# Patient Record
Sex: FEMALE | Race: BLACK OR AFRICAN AMERICAN | NOT HISPANIC OR LATINO | Employment: FULL TIME | URBAN - METROPOLITAN AREA
[De-identification: names, ages, dates, MRNs, and addresses within clinical notes are randomized per-mention and may not be internally consistent; named-entity substitution may affect disease eponyms.]

---

## 2025-01-23 ENCOUNTER — HOSPITAL ENCOUNTER (EMERGENCY)
Facility: HOSPITAL | Age: 40
Discharge: HOME | End: 2025-01-23
Payer: COMMERCIAL

## 2025-01-23 VITALS
RESPIRATION RATE: 16 BRPM | BODY MASS INDEX: 33.61 KG/M2 | WEIGHT: 178 LBS | HEIGHT: 61 IN | OXYGEN SATURATION: 99 % | SYSTOLIC BLOOD PRESSURE: 132 MMHG | TEMPERATURE: 98.7 F | DIASTOLIC BLOOD PRESSURE: 80 MMHG | HEART RATE: 112 BPM

## 2025-01-23 DIAGNOSIS — S61.219A FINGER LACERATION, INITIAL ENCOUNTER: Primary | ICD-10-CM

## 2025-01-23 PROCEDURE — 90471 IMMUNIZATION ADMIN: CPT

## 2025-01-23 PROCEDURE — 99284 EMERGENCY DEPT VISIT MOD MDM: CPT

## 2025-01-23 PROCEDURE — 99282 EMERGENCY DEPT VISIT SF MDM: CPT | Mod: 25

## 2025-01-23 PROCEDURE — 2500000004 HC RX 250 GENERAL PHARMACY W/ HCPCS (ALT 636 FOR OP/ED)

## 2025-01-23 PROCEDURE — 2500000005 HC RX 250 GENERAL PHARMACY W/O HCPCS

## 2025-01-23 PROCEDURE — 90715 TDAP VACCINE 7 YRS/> IM: CPT

## 2025-01-23 RX ORDER — BACITRACIN ZINC 500 UNIT/G
OINTMENT IN PACKET (EA) TOPICAL ONCE
Status: COMPLETED | OUTPATIENT
Start: 2025-01-23 | End: 2025-01-23

## 2025-01-23 RX ADMIN — BACITRACIN 1 APPLICATION: 500 OINTMENT TOPICAL at 18:32

## 2025-01-23 RX ADMIN — TETANUS TOXOID, REDUCED DIPHTHERIA TOXOID AND ACELLULAR PERTUSSIS VACCINE, ADSORBED 0.5 ML: 5; 2.5; 8; 8; 2.5 SUSPENSION INTRAMUSCULAR at 18:32

## 2025-01-23 ASSESSMENT — COLUMBIA-SUICIDE SEVERITY RATING SCALE - C-SSRS
2. HAVE YOU ACTUALLY HAD ANY THOUGHTS OF KILLING YOURSELF?: NO
1. IN THE PAST MONTH, HAVE YOU WISHED YOU WERE DEAD OR WISHED YOU COULD GO TO SLEEP AND NOT WAKE UP?: NO
6. HAVE YOU EVER DONE ANYTHING, STARTED TO DO ANYTHING, OR PREPARED TO DO ANYTHING TO END YOUR LIFE?: NO

## 2025-01-23 ASSESSMENT — PAIN - FUNCTIONAL ASSESSMENT: PAIN_FUNCTIONAL_ASSESSMENT: 0-10

## 2025-01-23 ASSESSMENT — PAIN SCALES - GENERAL: PAINLEVEL_OUTOF10: 4

## 2025-01-23 NOTE — DISCHARGE INSTRUCTIONS
Please keep the area of your COVID clean and dry.  You can wash your hands as normal, but do not directly scrub over the area.  I would also recommend patting it dry.  While at work, it is very important to double layer your gloves.

## 2025-01-23 NOTE — ED TRIAGE NOTES
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 Small lac bleeding controlled

## 2025-01-23 NOTE — Clinical Note
Michelle Dumont was seen and treated in our emergency department on 1/23/2025.  She may return to work on 01/23/2025.       If you have any questions or concerns, please don't hesitate to call.      Rajani Monge PA-C

## 2025-01-23 NOTE — ED PROVIDER NOTES
HPI   Chief Complaint   Patient presents with    Laceration       Patient is an otherwise healthy 39-year-old female presenting to the ED with a finger laceration.  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  Patient is right-hand dominant.  She endorses a very minor laceration to the right ring finger.  She states it was bleeding, but this has since subsided.  She denies any pain to the area.  Patient does believe she is up-to-date with her Tdap.              Patient History   Past Medical History:   Diagnosis Date    Encounter for contraceptive management, unspecified 07/01/2015    Contraception management    Encounter for other specified special examinations     Encounter for urine test    Personal history of other diseases of the nervous system and sense organs     History of sciatica     No past surgical history on file.  No family history on file.  Social History     Tobacco Use    Smoking status: Not on file    Smokeless tobacco: Not on file   Substance Use Topics    Alcohol use: Not on file    Drug use: Not on file       Physical Exam   ED Triage Vitals [01/23/25 1751]   Temperature Heart Rate Respirations BP   37.1 °C (98.7 °F) (!) 112 16 132/80      Pulse Ox Temp src Heart Rate Source Patient Position   99 % -- Monitor --      BP Location FiO2 (%)     -- --       Physical Exam  Vitals reviewed.   Constitutional:       General: She is not in acute distress.     Appearance: She is not ill-appearing.   Cardiovascular:      Rate and Rhythm: Tachycardia present.   Pulmonary:      Effort: Pulmonary effort is normal. No respiratory distress.      Breath sounds: Normal breath sounds.   Musculoskeletal:      Comments: Full ROM of all phalanges of the RUE.  Neurovascularly intact.   Skin:     General: Skin is warm and dry.      Comments: Skin scrape/superficial laceration to the pad of the right ring finger.  No  evidence of active bleeding.  Area is non-TTP.   Neurological:      General: No focal deficit present.      Mental Status: She is alert.           ED Course & MDM   Diagnoses as of 01/23/25 1833   Finger laceration, initial encounter                 No data recorded                                 Medical Decision Making  Patient is an otherwise healthy 39-year-old female presenting to the ED with a finger laceration.  History was obtained from the patient.  Suffered a laceration to her right ring finger while cutting tomatoes at work this evening.  She is right-hand dominant.  Bleeding has since subsided.  Believes she is up-to-date with her Tdap.  On physical exam, patient is sitting comfortably and in no apparent distress.  There is a skin scrape/superficial laceration to the pad of the right ring finger without evidence of active bleeding.  Area is non-TTP.  She has full ROM of all phalanges of the RUE and is neurovascularly intact.  Remainder of exam as noted above.  Initial vital signs significant for tachycardia with heart rate 112.  Otherwise afebrile and stable.    Patient's injury is very minor and does not require any kind of repair at this time.  It was cleaned via soaking in Betadine and tap water for about 10 minutes.  Review of patient's chart reveals she is not up-to-date with her Tdap and thus this was administered.  Bacitracin ointment was placed over her laceration which was then covered with a Band-Aid.  Patient is very adamant about returning to work this evening.  I do not believe there is any reason she cannot do this.  I did emphasize the importance she continue to keep the wound area clean, dry, and covered.  Recommended she use 2 sets of gloves while at work today and going forward for the next few days.  She is agreeable to this plan and all of her questions were answered to satisfaction.  Patient was discharged in stable condition.        Procedure  Procedures     Rajain Monge,  SHEMAR  01/23/25 1837

## 2025-07-09 ENCOUNTER — APPOINTMENT (OUTPATIENT)
Dept: RADIOLOGY | Facility: HOSPITAL | Age: 40
End: 2025-07-09
Payer: COMMERCIAL

## 2025-07-09 ENCOUNTER — HOSPITAL ENCOUNTER (EMERGENCY)
Facility: HOSPITAL | Age: 40
Discharge: HOME | End: 2025-07-09
Payer: COMMERCIAL

## 2025-07-09 VITALS
SYSTOLIC BLOOD PRESSURE: 185 MMHG | DIASTOLIC BLOOD PRESSURE: 137 MMHG | TEMPERATURE: 98.8 F | HEART RATE: 87 BPM | RESPIRATION RATE: 16 BRPM | OXYGEN SATURATION: 100 %
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
## 2025-07-09 DIAGNOSIS — S16.1XXA CERVICAL STRAIN, ACUTE, INITIAL ENCOUNTER: Primary | ICD-10-CM

## 2025-07-09 DIAGNOSIS — I10 ASYMPTOMATIC HYPERTENSION: ICD-10-CM

## 2025-07-09 DIAGNOSIS — M79.18 MUSCULOSKELETAL PAIN: ICD-10-CM

## 2025-07-09 PROCEDURE — 2500000004 HC RX 250 GENERAL PHARMACY W/ HCPCS (ALT 636 FOR OP/ED): Mod: JZ,SE | Performed by: NURSE PRACTITIONER

## 2025-07-09 PROCEDURE — 96374 THER/PROPH/DIAG INJ IV PUSH: CPT

## 2025-07-09 PROCEDURE — 70450 CT HEAD/BRAIN W/O DYE: CPT | Performed by: STUDENT IN AN ORGANIZED HEALTH CARE EDUCATION/TRAINING PROGRAM

## 2025-07-09 PROCEDURE — 99284 EMERGENCY DEPT VISIT MOD MDM: CPT | Performed by: NURSE PRACTITIONER

## 2025-07-09 PROCEDURE — 72125 CT NECK SPINE W/O DYE: CPT | Performed by: STUDENT IN AN ORGANIZED HEALTH CARE EDUCATION/TRAINING PROGRAM

## 2025-07-09 PROCEDURE — 99284 EMERGENCY DEPT VISIT MOD MDM: CPT | Mod: 25

## 2025-07-09 PROCEDURE — 70450 CT HEAD/BRAIN W/O DYE: CPT

## 2025-07-09 PROCEDURE — 72125 CT NECK SPINE W/O DYE: CPT

## 2025-07-09 PROCEDURE — 72131 CT LUMBAR SPINE W/O DYE: CPT | Performed by: STUDENT IN AN ORGANIZED HEALTH CARE EDUCATION/TRAINING PROGRAM

## 2025-07-09 PROCEDURE — 72131 CT LUMBAR SPINE W/O DYE: CPT

## 2025-07-09 PROCEDURE — 73030 X-RAY EXAM OF SHOULDER: CPT | Mod: LT

## 2025-07-09 PROCEDURE — 96375 TX/PRO/DX INJ NEW DRUG ADDON: CPT

## 2025-07-09 PROCEDURE — 73030 X-RAY EXAM OF SHOULDER: CPT | Mod: LEFT SIDE | Performed by: RADIOLOGY

## 2025-07-09 RX ORDER — CYCLOBENZAPRINE HCL 10 MG
10 TABLET ORAL 2 TIMES DAILY PRN
Qty: 20 TABLET | Refills: 0 | Status: SHIPPED | OUTPATIENT
Start: 2025-07-09 | End: 2025-07-19

## 2025-07-09 RX ORDER — KETOROLAC TROMETHAMINE 15 MG/ML
15 INJECTION, SOLUTION INTRAMUSCULAR; INTRAVENOUS ONCE
Status: COMPLETED | OUTPATIENT
Start: 2025-07-09 | End: 2025-07-09

## 2025-07-09 RX ORDER — IBUPROFEN 600 MG/1
600 TABLET, FILM COATED ORAL EVERY 6 HOURS PRN
Qty: 28 TABLET | Refills: 0 | Status: SHIPPED | OUTPATIENT
Start: 2025-07-09 | End: 2025-07-16

## 2025-07-09 RX ORDER — ORPHENADRINE CITRATE 30 MG/ML
60 INJECTION INTRAMUSCULAR; INTRAVENOUS ONCE
Status: COMPLETED | OUTPATIENT
Start: 2025-07-09 | End: 2025-07-09

## 2025-07-09 RX ADMIN — ORPHENADRINE CITRATE 60 MG: 60 INJECTION INTRAMUSCULAR; INTRAVENOUS at 12:37

## 2025-07-09 RX ADMIN — KETOROLAC TROMETHAMINE 15 MG: 15 INJECTION, SOLUTION INTRAMUSCULAR; INTRAVENOUS at 12:37
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
